# Patient Record
Sex: FEMALE | Race: WHITE | Employment: FULL TIME | URBAN - METROPOLITAN AREA
[De-identification: names, ages, dates, MRNs, and addresses within clinical notes are randomized per-mention and may not be internally consistent; named-entity substitution may affect disease eponyms.]

---

## 2022-04-10 ENCOUNTER — HOSPITAL ENCOUNTER (EMERGENCY)
Age: 32
Discharge: HOME | End: 2022-04-10
Attending: EMERGENCY MEDICINE
Payer: COMMERCIAL

## 2022-04-10 VITALS
OXYGEN SATURATION: 99 % | SYSTOLIC BLOOD PRESSURE: 108 MMHG | DIASTOLIC BLOOD PRESSURE: 58 MMHG | RESPIRATION RATE: 18 BRPM | WEIGHT: 145 LBS | TEMPERATURE: 99 F | BODY MASS INDEX: 21.98 KG/M2 | HEIGHT: 68 IN | HEART RATE: 61 BPM

## 2022-04-10 DIAGNOSIS — O20.0 THREATENED MISCARRIAGE: Primary | ICD-10-CM

## 2022-04-10 LAB
ABO/RH: NORMAL
ABO/RH: NORMAL
ANTIBODY SCREEN: NORMAL
BASOPHILS ABSOLUTE: 0 K/UL (ref 0–0.2)
BASOPHILS RELATIVE PERCENT: 0.4 %
BILIRUBIN URINE: NEGATIVE
BLOOD, URINE: NEGATIVE
CLARITY: CLEAR
COLOR: YELLOW
EOSINOPHILS ABSOLUTE: 0.1 K/UL (ref 0–0.6)
EOSINOPHILS RELATIVE PERCENT: 1 %
GLUCOSE URINE: NEGATIVE MG/DL
HCT VFR BLD CALC: 36.5 % (ref 36–48)
HEMOGLOBIN: 12.6 G/DL (ref 12–16)
KETONES, URINE: NEGATIVE MG/DL
LEUKOCYTE ESTERASE, URINE: NEGATIVE
LYMPHOCYTES ABSOLUTE: 1.6 K/UL (ref 1–5.1)
LYMPHOCYTES RELATIVE PERCENT: 22.1 %
MCH RBC QN AUTO: 30.5 PG (ref 26–34)
MCHC RBC AUTO-ENTMCNC: 34.6 G/DL (ref 31–36)
MCV RBC AUTO: 88.1 FL (ref 80–100)
MICROSCOPIC EXAMINATION: NORMAL
MONOCYTES ABSOLUTE: 0.5 K/UL (ref 0–1.3)
MONOCYTES RELATIVE PERCENT: 6.2 %
NEUTROPHILS ABSOLUTE: 5.2 K/UL (ref 1.7–7.7)
NEUTROPHILS RELATIVE PERCENT: 70.3 %
NITRITE, URINE: NEGATIVE
PDW BLD-RTO: 13.5 % (ref 12.4–15.4)
PH UA: 6 (ref 5–8)
PLATELET # BLD: 165 K/UL (ref 135–450)
PMV BLD AUTO: 9.1 FL (ref 5–10.5)
PROTEIN UA: NEGATIVE MG/DL
RBC # BLD: 4.15 M/UL (ref 4–5.2)
REASON FOR REJECTION: NORMAL
REJECTED TEST: NORMAL
RHIG LOT NUMBER: NORMAL
SPECIFIC GRAVITY UA: 1.02 (ref 1–1.03)
URINE REFLEX TO CULTURE: NORMAL
URINE TYPE: NORMAL
UROBILINOGEN, URINE: 0.2 E.U./DL
WBC # BLD: 7.4 K/UL (ref 4–11)

## 2022-04-10 PROCEDURE — 86901 BLOOD TYPING SEROLOGIC RH(D): CPT

## 2022-04-10 PROCEDURE — 81003 URINALYSIS AUTO W/O SCOPE: CPT

## 2022-04-10 PROCEDURE — 86850 RBC ANTIBODY SCREEN: CPT

## 2022-04-10 PROCEDURE — 6360000002 HC RX W HCPCS: Performed by: STUDENT IN AN ORGANIZED HEALTH CARE EDUCATION/TRAINING PROGRAM

## 2022-04-10 PROCEDURE — 85025 COMPLETE CBC W/AUTO DIFF WBC: CPT

## 2022-04-10 PROCEDURE — 36415 COLL VENOUS BLD VENIPUNCTURE: CPT

## 2022-04-10 PROCEDURE — 86900 BLOOD TYPING SEROLOGIC ABO: CPT

## 2022-04-10 PROCEDURE — 99283 EMERGENCY DEPT VISIT LOW MDM: CPT

## 2022-04-10 PROCEDURE — 96372 THER/PROPH/DIAG INJ SC/IM: CPT

## 2022-04-10 RX ADMIN — HUMAN RHO(D) IMMUNE GLOBULIN 120 MCG: 300 INJECTION, SOLUTION INTRAMUSCULAR at 23:41

## 2022-04-10 ASSESSMENT — ENCOUNTER SYMPTOMS
VOMITING: 0
ABDOMINAL PAIN: 1
SHORTNESS OF BREATH: 0
COUGH: 0
NAUSEA: 0

## 2022-04-10 ASSESSMENT — PAIN DESCRIPTION - ORIENTATION: ORIENTATION: LOWER

## 2022-04-10 ASSESSMENT — PAIN SCALES - GENERAL: PAINLEVEL_OUTOF10: 4

## 2022-04-10 ASSESSMENT — PAIN DESCRIPTION - DESCRIPTORS: DESCRIPTORS: CRAMPING

## 2022-04-11 NOTE — ED PROVIDER NOTES
ED Attending Attestation Note     Date of evaluation: 4/10/2022    This patient was seen by the resident. I have seen and examined the patient, agree with the workup, evaluation, management and diagnosis. The care plan has been discussed. My assessment reveals a female who comes in with vaginal spotting in the setting of a known intrauterine pregnancy. Has had some mild cramping. Abdomen is soft and nontender. Bedside ultrasound does reveal an IUP with normal fetal cardiac activity and fetal movement.      Pablito James MD  04/10/22 2101

## 2022-04-11 NOTE — ED TRIAGE NOTES
After sexual intercourse last evening patient has been having intermittent spotty brown vaginal bleeding and intermittent cramping

## 2022-04-11 NOTE — ED PROVIDER NOTES
[id number] [facility] RESIDENT NOTE       Date of evaluation: 4/10/2022    Chief Complaint     Vaginal Bleeding (11 weeks pregnant)      History of Present Illness     Diane Albert is a 27 y.o. female  at ~11wks pregnancy by LMP who presents with vaginal spotting. Patient reports a small amount of brownish discharge noted on toilet tissue and within underwear since last night. Patient notes that she had sex intercourse with her  and thinks may be the cause but wanted to be sure. She has intermittent lower abdominal cramping, mild in nature, nonradiating. She has persistent nausea throughout this pregnancy but no vomiting today. She is seen in [facility] and has had 2 OB appointments to this point, has a confirmed IUP according to the patient. Of note, she is a nurse practitioner. She otherwise denies dysuria or other vaginal discharge. Review of Systems     Review of Systems   Constitutional: Negative for chills and fever. Respiratory: Negative for cough and shortness of breath. Gastrointestinal: Positive for abdominal pain. Negative for nausea and vomiting. Genitourinary: Positive for pelvic pain and vaginal bleeding. Negative for hematuria. Neurological: Negative for dizziness and weakness. All other systems reviewed and are negative. Past Medical, Surgical, Family, and Social History     She has a past medical history of History of ITP. She has no past surgical history on file. Her family history is not on file. She reports that she has never smoked. She has never used smokeless tobacco. She reports previous alcohol use. She reports that she does not use drugs. Medications     Previous Medications    No medications on file       Allergies     She has No Known Allergies.     Physical Exam     INITIAL VITALS: BP: (!) 126/9, Temp: 99 °F (37.2 °C), Pulse: 69, Resp: 20, SpO2: 99 %   Physical Exam  Constitutional:       Appearance: Normal appearance. HENT:      Head: Normocephalic and atraumatic. Nose: Nose normal. No congestion. Mouth/Throat:      Mouth: Mucous membranes are moist.      Pharynx: Oropharynx is clear. Eyes:      Extraocular Movements: Extraocular movements intact. Pupils: Pupils are equal, round, and reactive to light. Cardiovascular:      Rate and Rhythm: Normal rate and regular rhythm. Pulses: Normal pulses. Heart sounds: Normal heart sounds. Pulmonary:      Effort: Pulmonary effort is normal. No respiratory distress. Breath sounds: Normal breath sounds. Abdominal:      General: Abdomen is flat. Palpations: Abdomen is soft. Tenderness: There is no abdominal tenderness. There is no guarding or rebound. Musculoskeletal:         General: Normal range of motion. Right lower leg: No edema. Left lower leg: No edema. Skin:     General: Skin is warm and dry. Neurological:      General: No focal deficit present. Mental Status: She is alert and oriented to person, place, and time.          DiagnosticResults       RADIOLOGY:  No orders to display       LABS:   Results for orders placed or performed during the hospital encounter of 04/10/22   Urinalysis with Reflex to Culture    Specimen: Urine   Result Value Ref Range    Color, UA Yellow Straw/Yellow    Clarity, UA Clear Clear    Glucose, Ur Negative Negative mg/dL    Bilirubin Urine Negative Negative    Ketones, Urine Negative Negative mg/dL    Specific Gravity, UA 1.025 1.005 - 1.030    Blood, Urine Negative Negative    pH, UA 6.0 5.0 - 8.0    Protein, UA Negative Negative mg/dL    Urobilinogen, Urine 0.2 <2.0 E.U./dL    Nitrite, Urine Negative Negative    Leukocyte Esterase, Urine Negative Negative    Microscopic Examination Not Indicated     Urine Type NotGiven     Urine Reflex to Culture Not Indicated    SPECIMEN REJECTION   Result Value Ref Range    Rejected Test CBCWD     Reason for Rejection see below    CBC with Auto Differential   Result Value Ref Range    WBC 7.4 4.0 - 11.0 K/uL    RBC 4.15 4.00 - 5.20 M/uL    Hemoglobin 12.6 12.0 - 16.0 g/dL    Hematocrit 36.5 36.0 - 48.0 %    MCV 88.1 80.0 - 100.0 fL    MCH 30.5 26.0 - 34.0 pg    MCHC 34.6 31.0 - 36.0 g/dL    RDW 13.5 12.4 - 15.4 %    Platelets 957 795 - 808 K/uL    MPV 9.1 5.0 - 10.5 fL    Neutrophils % 70.3 %    Lymphocytes % 22.1 %    Monocytes % 6.2 %    Eosinophils % 1.0 %    Basophils % 0.4 %    Neutrophils Absolute 5.2 1.7 - 7.7 K/uL    Lymphocytes Absolute 1.6 1.0 - 5.1 K/uL    Monocytes Absolute 0.5 0.0 - 1.3 K/uL    Eosinophils Absolute 0.1 0.0 - 0.6 K/uL    Basophils Absolute 0.0 0.0 - 0.2 K/uL       ED BEDSIDE ULTRASOUND:      RECENT VITALS:  BP: (!) 126/9, Temp: 99 °F (37.2 °C), Pulse: 69,Resp: 20, SpO2: 99 %     Procedures     Ultrasound - see QPathE    ED Course     Nursing Notes, Past Medical Hx, Past Surgical Hx, Social Hx, Allergies, and Family Hx were reviewed. The patient was given the followingmedications:  Orders Placed This Encounter   Medications    rho(D) immune globulin (HYPERRHO S/D) injection 150 mcg       CONSULTS:  None    MEDICAL DECISION MAKING / ASSESSMENT / Emily Joshua is a 27 y.o. female  with confirmed IUP by ultrasound performed at outside facility who presents today after having a small amount of vaginal discharge after having sexual intercourse last night, described as brownish in color, associated with mild lower abdominal cramping. On arrival, patient has normal vital signs, of note blood pressure was entered in error and diastolic was 90 (not 9). She has a benign abdomen. Pelvic exam was deferred. Transabdominal ultrasound was performed that showed an intrauterine pregnancy with a normal heartbeat and positive fetal movement. Patient did that she is Rh- and so was written for 150 mcg of intramuscular RhoGam to prevent sensitization. Her CBC showed no significant anemia.   Urinalysis was performed showing no asymptomatic bacteriuria. Patient has follow-up scheduled with her gynecologist.    This patient was also evaluated by the attending physician. All care plans werediscussed and agreed upon. Clinical Impression     1.  Threatened miscarriage        Disposition     PATIENT REFERRED TO: own Ob/Gyn    DISCHARGE MEDICATIONS:  New Prescriptions    No medications on file       DISPOSITION  : discharge home        Beth Butcher MD  Resident  04/10/22 0263